# Patient Record
Sex: MALE | Race: OTHER | NOT HISPANIC OR LATINO
[De-identification: names, ages, dates, MRNs, and addresses within clinical notes are randomized per-mention and may not be internally consistent; named-entity substitution may affect disease eponyms.]

---

## 2018-05-10 ENCOUNTER — TRANSCRIPTION ENCOUNTER (OUTPATIENT)
Age: 38
End: 2018-05-10

## 2018-05-11 ENCOUNTER — OUTPATIENT (OUTPATIENT)
Dept: OUTPATIENT SERVICES | Facility: HOSPITAL | Age: 38
LOS: 1 days | Discharge: HOME | End: 2018-05-11
Payer: MEDICARE

## 2018-05-11 DIAGNOSIS — M50.10 CERVICAL DISC DISORDER WITH RADICULOPATHY, UNSPECIFIED CERVICAL REGION: ICD-10-CM

## 2018-06-01 ENCOUNTER — OUTPATIENT (OUTPATIENT)
Dept: OUTPATIENT SERVICES | Facility: HOSPITAL | Age: 38
LOS: 1 days | End: 2018-06-01
Payer: MEDICARE

## 2018-06-01 DIAGNOSIS — M50.10 CERVICAL DISC DISORDER WITH RADICULOPATHY, UNSPECIFIED CERVICAL REGION: ICD-10-CM

## 2018-06-01 PROCEDURE — 76000 FLUOROSCOPY <1 HR PHYS/QHP: CPT

## 2018-06-01 PROCEDURE — 62321 NJX INTERLAMINAR CRV/THRC: CPT

## 2018-06-07 ENCOUNTER — TRANSCRIPTION ENCOUNTER (OUTPATIENT)
Age: 38
End: 2018-06-07

## 2018-06-08 ENCOUNTER — OUTPATIENT (OUTPATIENT)
Dept: OUTPATIENT SERVICES | Facility: HOSPITAL | Age: 38
LOS: 1 days | End: 2018-06-08
Payer: MEDICARE

## 2018-06-08 DIAGNOSIS — M47.812 SPONDYLOSIS WITHOUT MYELOPATHY OR RADICULOPATHY, CERVICAL REGION: ICD-10-CM

## 2018-06-08 PROCEDURE — 76000 FLUOROSCOPY <1 HR PHYS/QHP: CPT

## 2018-06-08 PROCEDURE — 77003 FLUOROGUIDE FOR SPINE INJECT: CPT

## 2018-06-08 PROCEDURE — 64490 INJ PARAVERT F JNT C/T 1 LEV: CPT | Mod: LT

## 2018-06-08 PROCEDURE — 62321 NJX INTERLAMINAR CRV/THRC: CPT

## 2018-06-08 PROCEDURE — 64491 INJ PARAVERT F JNT C/T 2 LEV: CPT | Mod: LT

## 2018-06-08 PROCEDURE — 64492 INJ PARAVERT F JNT C/T 3 LEV: CPT | Mod: LT

## 2019-03-21 ENCOUNTER — OUTPATIENT (OUTPATIENT)
Dept: OUTPATIENT SERVICES | Facility: HOSPITAL | Age: 39
LOS: 1 days | End: 2019-03-21
Payer: MEDICARE

## 2019-03-21 DIAGNOSIS — M50.10 CERVICAL DISC DISORDER WITH RADICULOPATHY, UNSPECIFIED CERVICAL REGION: ICD-10-CM

## 2019-03-21 PROCEDURE — 62321 NJX INTERLAMINAR CRV/THRC: CPT

## 2019-03-21 PROCEDURE — 77003 FLUOROGUIDE FOR SPINE INJECT: CPT

## 2019-04-29 ENCOUNTER — TRANSCRIPTION ENCOUNTER (OUTPATIENT)
Age: 39
End: 2019-04-29

## 2019-04-30 ENCOUNTER — OUTPATIENT (OUTPATIENT)
Dept: OUTPATIENT SERVICES | Facility: HOSPITAL | Age: 39
LOS: 1 days | End: 2019-04-30
Payer: MEDICARE

## 2019-04-30 DIAGNOSIS — M47.812 SPONDYLOSIS WITHOUT MYELOPATHY OR RADICULOPATHY, CERVICAL REGION: ICD-10-CM

## 2019-05-07 ENCOUNTER — OUTPATIENT (OUTPATIENT)
Dept: OUTPATIENT SERVICES | Facility: HOSPITAL | Age: 39
LOS: 1 days | End: 2019-05-07
Payer: MEDICARE

## 2019-05-07 DIAGNOSIS — M47.812 SPONDYLOSIS WITHOUT MYELOPATHY OR RADICULOPATHY, CERVICAL REGION: ICD-10-CM

## 2019-05-07 PROCEDURE — 76000 FLUOROSCOPY <1 HR PHYS/QHP: CPT

## 2019-05-07 PROCEDURE — 64492 INJ PARAVERT F JNT C/T 3 LEV: CPT | Mod: RT

## 2019-05-07 PROCEDURE — 64491 INJ PARAVERT F JNT C/T 2 LEV: CPT | Mod: RT

## 2019-05-07 PROCEDURE — 64490 INJ PARAVERT F JNT C/T 1 LEV: CPT | Mod: RT

## 2020-07-20 PROBLEM — Z00.00 ENCOUNTER FOR PREVENTIVE HEALTH EXAMINATION: Status: ACTIVE | Noted: 2020-07-20

## 2020-07-27 ENCOUNTER — TRANSCRIPTION ENCOUNTER (OUTPATIENT)
Age: 40
End: 2020-07-27

## 2020-07-27 ENCOUNTER — APPOINTMENT (OUTPATIENT)
Dept: ORTHOPEDIC SURGERY | Facility: CLINIC | Age: 40
End: 2020-07-27
Payer: MEDICARE

## 2020-07-27 VITALS — TEMPERATURE: 97.7 F

## 2020-07-27 VITALS
DIASTOLIC BLOOD PRESSURE: 77 MMHG | HEART RATE: 67 BPM | WEIGHT: 205 LBS | SYSTOLIC BLOOD PRESSURE: 122 MMHG | HEIGHT: 70 IN | BODY MASS INDEX: 29.35 KG/M2

## 2020-07-27 DIAGNOSIS — Z87.09 PERSONAL HISTORY OF OTHER DISEASES OF THE RESPIRATORY SYSTEM: ICD-10-CM

## 2020-07-27 DIAGNOSIS — Z82.0 FAMILY HISTORY OF EPILEPSY AND OTHER DISEASES OF THE NERVOUS SYSTEM: ICD-10-CM

## 2020-07-27 DIAGNOSIS — Z86.39 PERSONAL HISTORY OF OTHER ENDOCRINE, NUTRITIONAL AND METABOLIC DISEASE: ICD-10-CM

## 2020-07-27 DIAGNOSIS — Z83.3 FAMILY HISTORY OF DIABETES MELLITUS: ICD-10-CM

## 2020-07-27 DIAGNOSIS — Z78.9 OTHER SPECIFIED HEALTH STATUS: ICD-10-CM

## 2020-07-27 DIAGNOSIS — M25.561 PAIN IN RIGHT KNEE: ICD-10-CM

## 2020-07-27 PROCEDURE — 73562 X-RAY EXAM OF KNEE 3: CPT | Mod: RT

## 2020-07-27 PROCEDURE — 99203 OFFICE O/P NEW LOW 30 MIN: CPT

## 2020-07-27 RX ORDER — ATORVASTATIN CALCIUM 80 MG/1
TABLET, FILM COATED ORAL
Refills: 0 | Status: ACTIVE | COMMUNITY

## 2020-07-27 RX ORDER — CARISOPRODOL 250 MG/1
TABLET ORAL
Refills: 0 | Status: ACTIVE | COMMUNITY

## 2020-07-27 RX ORDER — FAMOTIDINE 20 MG/1
20 TABLET, FILM COATED ORAL
Refills: 0 | Status: ACTIVE | COMMUNITY

## 2020-07-27 RX ORDER — MONTELUKAST SODIUM 10 MG/1
TABLET, FILM COATED ORAL
Refills: 0 | Status: ACTIVE | COMMUNITY

## 2020-07-27 RX ORDER — HYDROCODONE BITARTRATE AND ACETAMINOPHEN 7.5; 325 MG/1; MG/1
7.5-325 TABLET ORAL
Refills: 0 | Status: ACTIVE | COMMUNITY

## 2020-07-27 RX ORDER — ONDANSETRON HYDROCHLORIDE 4 MG/1
4 TABLET, FILM COATED ORAL
Refills: 0 | Status: ACTIVE | COMMUNITY

## 2020-07-27 NOTE — HISTORY OF PRESENT ILLNESS
[Pain Location] : pain [___ wks] : [unfilled] week(s) ago [5] : a current pain level of 5/10 [Walking] : worsened by walking [Bending] : worsened by bending [Intermit.] : ~He/She~ states the symptoms seem to be intermittent [Knee Flexion] : worsened with knee flexion [Rest] : relieved by rest [Ice] : relieved by ice [Improving] : improving [de-identified] : This is 38 y/o male with right knee pain for 3 weeks. He woke up one morning with severe pain and swelling after few days of mild pain. He was seen by [facility] orthopedics. He had MRI of right knee showing meniscus tear. He is here for surgical evaluation. His pain is in the medial > lateral joint, it is localized. The pain is sharp and stabbing. Applying ice improve the pain. Walking, bending, and squatting make the pain worse. He has been using Tylenol. He report c spine sx last in 1/2020 with Dr. Martinez. He denies having gout.

## 2020-07-27 NOTE — END OF VISIT
[FreeTextEntry3] : I, Anibal Clement, acted solely as a scribe for Dr. Jean Marie Fox on this date 07/27/2020.

## 2020-07-27 NOTE — DISCUSSION/SUMMARY
[de-identified] : 38 y/o male with free edge tear of the posterior horn medial meniscus tear of the right knee. Conservative therapy and surgical options discussed in detail with patient. We discussed the possible reasons why he may have had effusion including gout, pseudogout, Lymes disease, and rheumatoid arthritis, but at this point his symptoms are resolving. We prescribed him PT. He should do low impact exercises. If the pain is persistent, we discussed aspirating the knee and an arthroscopy and possible Rheum eval. F/u with us in eight weeks.\par \par \par The percentages of success in an arthroscopy that involves a torn meniscus and arthritic changes is dependent upon how bad the arthritic changes are. Basically, removing a meniscal tear allows us to ascertain how bad the patient's articular cartilage destruction (arthritis) is. The arthroscopy cleans out any debris from the arthritic process as well as removing the meniscal tear. Approximately 75% of the patients will say that they feel relief, although their x-rays will continue to show significant arthritic changes. Arthroscopy for arthritis is a temporizing procedure, yielding subjective success (patient satisfaction) for less than two to five years. In some cases, the knee might eventually require a knee replacement for symptomatic relief. The prognostic factors that are somewhat favorable predictive values in arthroscopic debridements (removal of loose articular cartilage, loose body and inflamed synovium) of an arthritic knee are: short duration of symptoms, effusion (swelling), minimal deformity and good range of motion. The complications with any arthroscopy include the risk of anaesthetic complications and death, blood clots and pulmonary embolus, infection (less than 1%), nerve damage, by which we would mean a peroneal palsy (less than 0.1%) (small area of skin numbness is so common, we do not consider its presence a complication), injury to the popliteal artery, which is so rare that there are no statistics, but should it occur could theoretically lead to amputation, which is extremely unlikely. There is often a chance of getting a hemarthrosis (blood in the joint) but this usually resolves with local measures of icing, physical therapy, and aspiration. Reflex sympathetic dystrophy (RSD) is another extremely rare but theoretical complication. This (RSD) means that the patient has a stiff painful joint that is out of proportion to the objective pathology of the knee. Subsequently, it might require years of physical therapy before one regains a functional knee with RSD. Infrapatellar contracture syndrome (stiff joint) is sometimes reported and associated with RSD, but it usually is a result of not being aggressive in physical therapy. I think the patient understands the risk benefit ratio of arthroscopy and will think about whether they would prefer the nonoperative or surgical treatment option.

## 2020-07-27 NOTE — PHYSICAL EXAM
[de-identified] : GENERAL APPEARANCE:   Well nourished and hydrated, pleasant, alert, and oriented x 4.  \par CARDIOVASCULAR:   No apparent abnormalities.  No lower leg edema.  No varicosities.  Pedal pulses are palpable.\par RESPIRATORY: Breathe sound clear and audible in all lobes. No wheezing, No accessory muscle use.\par NEUROLOGIC:   Sensation is normal, no muscle weakness in the upper or lower extremities.\par DERMATOLOGIC:   No apparent skin lesions, moist, warm, no rash.\par SPINE:   Cervical spine appears normal and moves freely; thoracic spine appears normal and moves freely; lumbosacral spine appears normal and moves freely, normal, nontender.\par MUSCULOSKELETAL:   Hands, wrists, and elbows are normal and move freely, shoulders are normal and move freely. \par  [Antalgic] : not antalgic [de-identified] : right knee examination shows neutral alignment, medial and lateral joint line tenderness where medial is worse than lateral, positive medial Mariah's maneuver [de-identified] : 3 view right knee x-rays demonstrate a well-preserved joints without findings of any evidence of fracture, dislocation,\par  or any other acute orthopedic pathology. There is No radiographic features of OA are present.\par \par MRI of right knee done in 7/14/2020 showing free edge tear of the posterior horn medial meniscus\par mild medial compartment chondral generation.\par reactive subchondral bone marrow edema in the posteromedial tibial plateau\par large effusion and small popiteal cyst

## 2020-10-23 ENCOUNTER — APPOINTMENT (OUTPATIENT)
Dept: ORTHOPEDIC SURGERY | Facility: CLINIC | Age: 40
End: 2020-10-23

## 2021-01-17 ENCOUNTER — OUTPATIENT (OUTPATIENT)
Dept: OUTPATIENT SERVICES | Facility: HOSPITAL | Age: 41
LOS: 1 days | End: 2021-01-17
Payer: MEDICARE

## 2021-01-17 DIAGNOSIS — Z20.828 CONTACT WITH AND (SUSPECTED) EXPOSURE TO OTHER VIRAL COMMUNICABLE DISEASES: ICD-10-CM

## 2021-01-17 LAB — SARS-COV-2 RNA SPEC QL NAA+PROBE: SIGNIFICANT CHANGE UP

## 2021-01-17 PROCEDURE — U0005: CPT

## 2021-01-17 PROCEDURE — C9803: CPT

## 2021-01-17 PROCEDURE — U0003: CPT

## 2021-01-18 DIAGNOSIS — Z20.828 CONTACT WITH AND (SUSPECTED) EXPOSURE TO OTHER VIRAL COMMUNICABLE DISEASES: ICD-10-CM

## 2021-09-01 ENCOUNTER — TRANSCRIPTION ENCOUNTER (OUTPATIENT)
Age: 41
End: 2021-09-01

## 2021-09-24 ENCOUNTER — APPOINTMENT (OUTPATIENT)
Dept: ORTHOPEDIC SURGERY | Facility: CLINIC | Age: 41
End: 2021-09-24
Payer: MEDICARE

## 2021-09-24 VITALS
SYSTOLIC BLOOD PRESSURE: 117 MMHG | HEIGHT: 70 IN | WEIGHT: 205 LBS | DIASTOLIC BLOOD PRESSURE: 81 MMHG | HEART RATE: 70 BPM | BODY MASS INDEX: 29.35 KG/M2

## 2021-09-24 PROCEDURE — 99213 OFFICE O/P EST LOW 20 MIN: CPT

## 2021-09-24 RX ORDER — MELOXICAM 15 MG/1
15 TABLET ORAL
Qty: 30 | Refills: 0 | Status: ACTIVE | COMMUNITY
Start: 2021-05-19

## 2021-09-24 RX ORDER — METHOCARBAMOL 500 MG/1
500 TABLET, FILM COATED ORAL
Qty: 50 | Refills: 0 | Status: ACTIVE | COMMUNITY
Start: 2021-07-06

## 2021-09-24 RX ORDER — ONDANSETRON 4 MG/1
4 TABLET, ORALLY DISINTEGRATING ORAL
Qty: 20 | Refills: 0 | Status: ACTIVE | COMMUNITY
Start: 2021-02-23

## 2021-09-24 RX ORDER — ATORVASTATIN CALCIUM 40 MG/1
40 TABLET, FILM COATED ORAL
Qty: 30 | Refills: 0 | Status: ACTIVE | COMMUNITY
Start: 2021-05-11

## 2021-09-24 NOTE — DISCUSSION/SUMMARY
[de-identified] : 40 y/o male with right knee pain. He had a right knee MRI on 7/14/2020 which showed a free edge tear of the posterior horn medial meniscus tear of the right knee. He started to develop right knee pain again recently without injury/trauma. His pain is consistent with the pain he had one year ago. He will get an updated MRI of the right knee to r/o recurrent meniscus tear. We offered to provide a pes bursa cortisone injection today, but he deferred. F/u after the MRI.

## 2021-09-24 NOTE — END OF VISIT
[FreeTextEntry3] : I, Anibal Clement, acted solely as a scribe for Dr. Jean Marie Fox on this date 09/24/2021.

## 2021-09-24 NOTE — HISTORY OF PRESENT ILLNESS
[Pain Location] : pain [___ yrs] : [unfilled] year(s) ago [5] : a current pain level of 5/10 [Intermit.] : ~He/She~ states the symptoms seem to be intermittent [Bending] : worsened by bending [Walking] : worsened by walking [Knee Flexion] : worsened with knee flexion [Ice] : relieved by ice [Rest] : relieved by rest [de-identified] : 42 y/o male retired  from a back fusion. He comes back to the office today for right knee pain without injury/trauma. His pain is in the medial and anterior aspect of the knee. We originally saw the pt in July 2020 when his pain first started. He had a right knee MRI on 7/14/2020 which showed a Nonspecific free edge tear of the posterior horn medial meniscus. He has pain with walking, bending, and squatting. Pt has not gone to PT nor a rheumatology evaluation.

## 2021-09-24 NOTE — PHYSICAL EXAM
[LE] : Sensory: Intact in bilateral lower extremities [ALL] : dorsalis pedis, posterior tibial, femoral, popliteal, and radial 2+ and symmetric bilaterally [Normal] : Alert and in no acute distress [Antalgic] : not antalgic [Poor Appearance] : well-appearing [de-identified] : GENERAL APPEARANCE: Well nourished and hydrated, pleasant, alert, and oriented x 3. Appears their stated age. \par HEENT: Normocephalic, extraocular eye motion intact. Nasal septum midline. Oral cavity clear. External auditory canal clear. \par RESPIRATORY: Breath sounds clear and audible in all lobes. No wheezing, No accessory muscle use.\par CARDIOVASCULAR: No apparent abnormalities. No lower leg edema. No varicosities. Pedal pulses are palpable.\par NEUROLOGIC: Sensation is normal, no muscle weakness in the upper or lower extremities.\par DERMATOLOGIC: No apparent skin lesions, moist, warm, no rash.\par SPINE: Cervical spine appears normal and moves freely; thoracic spine appears normal and moves freely; lumbosacral spine appears normal and moves freely, normal, nontender.\par MUSCULOSKELETAL: Hands, wrists, and elbows are normal and move freely, shoulders are normal and move freely.  [de-identified] : right knee examination shows tenderness over the pes bursa, positive medial Mariah's maneuver, mild effusion.  [de-identified] : MRI of the right knee performed at [facility] on 7/14/2020 showed the following:\par 1. Nonspecific free edge tear of the posterior horn medial meniscus.\par 2. Mild medial compartment and minor lateral tibial plateau chondral degeneration.\par 3. Minimal reactive subchondral bone marrow edema in the posteromedial tibial plateau.\par 4. Large joint effusion with small popliteal cyst and mild scattered synovial hypertrophy.

## 2021-10-01 ENCOUNTER — APPOINTMENT (OUTPATIENT)
Dept: ORTHOPEDIC SURGERY | Facility: CLINIC | Age: 41
End: 2021-10-01
Payer: MEDICARE

## 2021-10-01 DIAGNOSIS — M25.461 EFFUSION, RIGHT KNEE: ICD-10-CM

## 2021-10-01 PROCEDURE — 99441: CPT | Mod: 95

## 2021-10-01 NOTE — HISTORY OF PRESENT ILLNESS
[Pain Location] : pain [4] : a current pain level of 4/10 [de-identified] : 42 y/o male presented via [employer] to review MRI of right knee done on 9/29/21\par the pain started without injury \par  His pain is in the medial and anterior aspect of the knee.\par \par  He had a right knee MRI on 7/14/2020 which showed a Nonspecific free edge tear of the posterior horn medial meniscus. He has pain with walking, bending, and squatting. \par \par His most recent MRI MRI shows no specific internal derangement.,  He does have a small popliteal cyst.\par \par Symptoms of the same he still has pain on the medial aspect of the knee

## 2021-10-01 NOTE — DISCUSSION/SUMMARY
[de-identified] : 42 y/o male presented via [employer] to review MRI of right knee done on 9/29/21\par the pain started without injury \par  His pain is in the medial and anterior aspect of the knee.\par \par  He had a right knee MRI on 7/14/2020 which showed a Nonspecific free edge tear of the posterior horn medial meniscus. He has pain with walking, bending, and squatting. \par \par His most recent MRI MRI shows no specific internal derangement.,  He does have a small popliteal cyst.\par \par Symptoms of the same he still has pain on the medial aspect of the knee.\par \par \par I encouraged the patient to begin physical therapy.  A prescription was provided.  If he does not improve with conservative treatment he will follow back up with us

## 2022-06-27 ENCOUNTER — NON-APPOINTMENT (OUTPATIENT)
Age: 42
End: 2022-06-27

## 2022-06-29 ENCOUNTER — APPOINTMENT (OUTPATIENT)
Dept: ORTHOPEDIC SURGERY | Facility: CLINIC | Age: 42
End: 2022-06-29

## 2022-06-29 VITALS
DIASTOLIC BLOOD PRESSURE: 86 MMHG | BODY MASS INDEX: 29.35 KG/M2 | HEIGHT: 70 IN | SYSTOLIC BLOOD PRESSURE: 137 MMHG | HEART RATE: 65 BPM | WEIGHT: 205 LBS

## 2022-06-29 DIAGNOSIS — S83.242D OTHER TEAR OF MEDIAL MENISCUS, CURRENT INJURY, LEFT KNEE, SUBSEQUENT ENCOUNTER: ICD-10-CM

## 2022-06-29 PROCEDURE — 99214 OFFICE O/P EST MOD 30 MIN: CPT | Mod: 25

## 2022-06-29 PROCEDURE — 20610 DRAIN/INJ JOINT/BURSA W/O US: CPT | Mod: LT

## 2022-06-29 PROCEDURE — 73562 X-RAY EXAM OF KNEE 3: CPT | Mod: LT

## 2022-06-29 NOTE — PHYSICAL EXAM
[LE] : Sensory: Intact in bilateral lower extremities [ALL] : dorsalis pedis, posterior tibial, femoral, popliteal, and radial 2+ and symmetric bilaterally [Normal] : Alert and in no acute distress [Antalgic] : not antalgic [Poor Appearance] : well-appearing [de-identified] : GENERAL APPEARANCE: Well nourished and hydrated, pleasant, alert, and oriented x 3. Appears their stated age. \par HEENT: Normocephalic, extraocular eye motion intact. Nasal septum midline. Oral cavity clear. External auditory canal clear. \par RESPIRATORY: Breath sounds clear and audible in all lobes. No wheezing, No accessory muscle use.\par CARDIOVASCULAR: No apparent abnormalities. No lower leg edema. No varicosities. Pedal pulses are palpable.\par NEUROLOGIC: Sensation is normal, no muscle weakness in the upper or lower extremities.\par DERMATOLOGIC: No apparent skin lesions, moist, warm, no rash.\par SPINE: Cervical spine appears normal and moves freely; thoracic spine appears normal and moves freely; lumbosacral spine appears normal and moves freely, normal, nontender.\par MUSCULOSKELETAL: Hands, wrists, and elbows are normal and move freely, shoulders are normal and move freely.  [de-identified] : Left knee exam shows FROM, no effusion, medial joint line tenderness [de-identified] : 3V xray of the left knee done in the office today and reviewed by Dr. Jean Marie Fox demonstrates minimal medial compartmental osteoarthritis \par \par MRI of the left knee performed at [facility] on 6/8/2022 showed the following:\par 1. Medial meniscus tear. The lateral meniscus and the cruciate ligaments are intact.\par 2. Cartilage loss along the weightbearing surface of the medial femoral condyle.\par 3. Moderate, thin fluid within the semimembranosus bursa.

## 2022-06-29 NOTE — DISCUSSION/SUMMARY
[Medication Risks Reviewed] : Medication risks reviewed [Surgical risks reviewed] : Surgical risks reviewed [de-identified] : 40 y/o M with minimal medial compartmental osteoarthritis and a radial tear of the medial meniscus of the left knee. Surgical options and conservative therapies were discussed in detail. Based on imaging, he is a candidate for a left knee arthroscopy. The conservative therapies we talked about included cortisone injections, antiinflammatories, and PT. We encourage him to start with conservative therapies and if he doesn't respond, it would be reasonable for him to pursue a knee scope. He opted for a left knee cortisone injection and we gave her a rx for PT. F/u in 3 months.\par \par The percentages of success in an arthroscopy that involves a torn meniscus and arthritic changes is dependent upon how bad the arthritic changes are. Basically, removing a meniscal tear allows us to ascertain how bad the patient's articular cartilage destruction (arthritis) is. The arthroscopy cleans out any debris from the arthritic process as well as removing the meniscal tear. Approximately 75% of the patients will say that they feel relief, although their x-rays will continue to show significant arthritic changes. Arthroscopy for arthritis is a temporizing procedure, yielding subjective success (patient satisfaction) for less than two to five years. In some cases, the knee might eventually require a knee replacement for symptomatic relief. The prognostic factors that are somewhat favorable predictive values in arthroscopic debridements (removal of loose articular cartilage, loose body and inflamed synovium) of an arthritic knee are: short duration of symptoms, effusion (swelling), minimal deformity and good range of motion. The complications with any arthroscopy include the risk of anaesthetic complications and death, blood clots and pulmonary embolus, infection (less than 1%), nerve damage, by which we would mean a peroneal palsy (less than 0.1%) (small area of skin numbness is so common, we do not consider its presence a complication), injury to the popliteal artery, which is so rare that there are no statistics, but should it occur could theoretically lead to amputation, which is extremely unlikely. There is often a chance of getting a hemarthrosis (blood in the joint) but this usually resolves with local measures of icing, physical therapy, and aspiration. Reflex sympathetic dystrophy (RSD) is another extremely rare but theoretical complication. This (RSD) means that the patient has a stiff painful joint that is out of proportion to the objective pathology of the knee. Subsequently, it might require years of physical therapy before one regains a functional knee with RSD. Infrapatellar contracture syndrome (stiff joint) is sometimes reported and associated with RSD, but it usually is a result of not being aggressive in physical therapy. I think the patient understands the risk benefit ratio of arthroscopy and will think about whether they would prefer the nonoperative or surgical treatment option.

## 2022-06-29 NOTE — PROCEDURE
[de-identified] : I injected the patient's left knee today with cortisone for primary osteoarthritis.\par \par I discussed at length with the patient the planned steroid and lidocaine injection. The risks, benefits, convalescence and alternatives were reviewed. The possible side effects discussed included but were not limited to: pain, swelling, heat, bleeding, and redness. Symptoms are generally mild but if they are extensive then contact the office. Giving pain relievers by mouth such as NSAIDs or Tylenol can generally treat the reactions to steroid and lidocaine. Rare cases of infection have been noted. Rash, hives and itching may occur post injection. If you have muscle pain or cramps, flushing and or swelling of the face, rapid heart beat, nausea, dizziness, fever, chills, headache, difficulty breathing, swelling in the arms or legs, or have a prickly feeling of your skin, contact a health care provider immediately. Following this discussion, the knee was prepped with Alcohol and under sterile condition the 80 mg Depo-Medrol and 6 cc Lidocaine injection was performed with a 20 gauge needle through a superolateral injection site. The needle was introduced into the joint, aspiration was performed to ensure intra-articular placement and the medication was injected. Upon withdrawal of the needle the site was cleaned with alcohol and a band aid applied. The patient tolerated the injection well and there were no adverse effects. Post injection instructions included no strenuous activity for 24 hours, cryotherapy and if there are any adverse effects to contact the office.

## 2022-06-29 NOTE — END OF VISIT
[FreeTextEntry3] : I, Anibal Clement, acted solely as a scribe for Dr. Jean Marie Fox on this date 06/29/2022.

## 2022-06-29 NOTE — HISTORY OF PRESENT ILLNESS
[2] : a current pain level of 2/10 [7] : a maximum pain level of 7/10 [Pain Location] : pain [NSAIDs] : relieved by nonsteroidal anti-inflammatory drugs [Rest] : relieved by rest [de-identified] : 42 y/o M presents with left knee pain. The pain started about 1 M ago without injury. He notes sharp medial knee pain with twisting. He also has pain with stairs. He notes some instability. He had a left knee MRI on 6/8/22 showing a small radial tear of medial meniscus and cartilage loss at the wt bearing surface of the medical femoral condyle. He sometimes takes Mobic which helps. Pt was seen for right knee pain in the past and he reports mild intermittent pan. He is a retired  from a back fusion. [de-identified] : stairs, twisting

## 2022-09-26 ENCOUNTER — APPOINTMENT (OUTPATIENT)
Dept: ORTHOPEDIC SURGERY | Facility: CLINIC | Age: 42
End: 2022-09-26

## 2022-09-26 VITALS
HEIGHT: 70 IN | HEART RATE: 66 BPM | SYSTOLIC BLOOD PRESSURE: 124 MMHG | TEMPERATURE: 97.9 F | DIASTOLIC BLOOD PRESSURE: 84 MMHG

## 2022-09-26 DIAGNOSIS — S83.241A OTHER TEAR OF MEDIAL MENISCUS, CURRENT INJURY, RIGHT KNEE, INITIAL ENCOUNTER: ICD-10-CM

## 2022-09-26 DIAGNOSIS — M17.12 UNILATERAL PRIMARY OSTEOARTHRITIS, LEFT KNEE: ICD-10-CM

## 2022-09-26 DIAGNOSIS — M17.11 UNILATERAL PRIMARY OSTEOARTHRITIS, RIGHT KNEE: ICD-10-CM

## 2022-09-26 PROCEDURE — 99214 OFFICE O/P EST MOD 30 MIN: CPT

## 2022-10-11 ENCOUNTER — NON-APPOINTMENT (OUTPATIENT)
Age: 42
End: 2022-10-11

## 2022-10-24 ENCOUNTER — OUTPATIENT (OUTPATIENT)
Dept: OUTPATIENT SERVICES | Facility: HOSPITAL | Age: 42
LOS: 1 days | End: 2022-10-24
Payer: MEDICARE

## 2022-10-24 VITALS
DIASTOLIC BLOOD PRESSURE: 68 MMHG | WEIGHT: 203.71 LBS | HEART RATE: 72 BPM | SYSTOLIC BLOOD PRESSURE: 106 MMHG | HEIGHT: 70 IN | TEMPERATURE: 97 F | OXYGEN SATURATION: 98 % | RESPIRATION RATE: 16 BRPM

## 2022-10-24 DIAGNOSIS — Z29.9 ENCOUNTER FOR PROPHYLACTIC MEASURES, UNSPECIFIED: ICD-10-CM

## 2022-10-24 DIAGNOSIS — Z01.818 ENCOUNTER FOR OTHER PREPROCEDURAL EXAMINATION: ICD-10-CM

## 2022-10-24 DIAGNOSIS — Z98.890 OTHER SPECIFIED POSTPROCEDURAL STATES: Chronic | ICD-10-CM

## 2022-10-24 DIAGNOSIS — M25.562 PAIN IN LEFT KNEE: ICD-10-CM

## 2022-10-24 LAB
A1C WITH ESTIMATED AVERAGE GLUCOSE RESULT: 5.5 % — SIGNIFICANT CHANGE UP (ref 4–5.6)
ANION GAP SERPL CALC-SCNC: 6 MMOL/L — SIGNIFICANT CHANGE UP (ref 5–17)
APTT BLD: 29.8 SEC — SIGNIFICANT CHANGE UP (ref 27.5–35.5)
BASOPHILS # BLD AUTO: 0.05 K/UL — SIGNIFICANT CHANGE UP (ref 0–0.2)
BASOPHILS NFR BLD AUTO: 1 % — SIGNIFICANT CHANGE UP (ref 0–2)
BLD GP AB SCN SERPL QL: SIGNIFICANT CHANGE UP
BUN SERPL-MCNC: 13 MG/DL — SIGNIFICANT CHANGE UP (ref 8–20)
CALCIUM SERPL-MCNC: 8.9 MG/DL — SIGNIFICANT CHANGE UP (ref 8.4–10.5)
CHLORIDE SERPL-SCNC: 103 MMOL/L — SIGNIFICANT CHANGE UP (ref 98–107)
CO2 SERPL-SCNC: 31 MMOL/L — HIGH (ref 22–29)
CREAT SERPL-MCNC: 0.98 MG/DL — SIGNIFICANT CHANGE UP (ref 0.5–1.3)
EGFR: 99 ML/MIN/1.73M2 — SIGNIFICANT CHANGE UP
EOSINOPHIL # BLD AUTO: 0.18 K/UL — SIGNIFICANT CHANGE UP (ref 0–0.5)
EOSINOPHIL NFR BLD AUTO: 3.7 % — SIGNIFICANT CHANGE UP (ref 0–6)
ESTIMATED AVERAGE GLUCOSE: 111 MG/DL — SIGNIFICANT CHANGE UP (ref 68–114)
GLUCOSE SERPL-MCNC: 104 MG/DL — HIGH (ref 70–99)
HCT VFR BLD CALC: 46.8 % — SIGNIFICANT CHANGE UP (ref 39–50)
HGB BLD-MCNC: 15.3 G/DL — SIGNIFICANT CHANGE UP (ref 13–17)
IMM GRANULOCYTES NFR BLD AUTO: 0.2 % — SIGNIFICANT CHANGE UP (ref 0–0.9)
INR BLD: 0.98 RATIO — SIGNIFICANT CHANGE UP (ref 0.88–1.16)
LYMPHOCYTES # BLD AUTO: 1.43 K/UL — SIGNIFICANT CHANGE UP (ref 1–3.3)
LYMPHOCYTES # BLD AUTO: 29.2 % — SIGNIFICANT CHANGE UP (ref 13–44)
MCHC RBC-ENTMCNC: 28.4 PG — SIGNIFICANT CHANGE UP (ref 27–34)
MCHC RBC-ENTMCNC: 32.7 GM/DL — SIGNIFICANT CHANGE UP (ref 32–36)
MCV RBC AUTO: 87 FL — SIGNIFICANT CHANGE UP (ref 80–100)
MONOCYTES # BLD AUTO: 0.38 K/UL — SIGNIFICANT CHANGE UP (ref 0–0.9)
MONOCYTES NFR BLD AUTO: 7.8 % — SIGNIFICANT CHANGE UP (ref 2–14)
NEUTROPHILS # BLD AUTO: 2.84 K/UL — SIGNIFICANT CHANGE UP (ref 1.8–7.4)
NEUTROPHILS NFR BLD AUTO: 58.1 % — SIGNIFICANT CHANGE UP (ref 43–77)
PLATELET # BLD AUTO: 254 K/UL — SIGNIFICANT CHANGE UP (ref 150–400)
POTASSIUM SERPL-MCNC: 4.4 MMOL/L — SIGNIFICANT CHANGE UP (ref 3.5–5.3)
POTASSIUM SERPL-SCNC: 4.4 MMOL/L — SIGNIFICANT CHANGE UP (ref 3.5–5.3)
PROTHROM AB SERPL-ACNC: 11.4 SEC — SIGNIFICANT CHANGE UP (ref 10.5–13.4)
RBC # BLD: 5.38 M/UL — SIGNIFICANT CHANGE UP (ref 4.2–5.8)
RBC # FLD: 13 % — SIGNIFICANT CHANGE UP (ref 10.3–14.5)
SODIUM SERPL-SCNC: 140 MMOL/L — SIGNIFICANT CHANGE UP (ref 135–145)
WBC # BLD: 4.89 K/UL — SIGNIFICANT CHANGE UP (ref 3.8–10.5)
WBC # FLD AUTO: 4.89 K/UL — SIGNIFICANT CHANGE UP (ref 3.8–10.5)

## 2022-10-24 PROCEDURE — 93010 ELECTROCARDIOGRAM REPORT: CPT

## 2022-10-24 PROCEDURE — 93005 ELECTROCARDIOGRAM TRACING: CPT

## 2022-10-24 PROCEDURE — G0463: CPT

## 2022-10-24 RX ORDER — SODIUM CHLORIDE 9 MG/ML
3 INJECTION INTRAMUSCULAR; INTRAVENOUS; SUBCUTANEOUS ONCE
Refills: 0 | Status: DISCONTINUED | OUTPATIENT
Start: 2022-11-08 | End: 2022-11-22

## 2022-10-24 NOTE — H&P PST ADULT - NSICDXPASTMEDICALHX_GEN_ALL_CORE_FT
PAST MEDICAL HISTORY:  Cervical radiculopathy     Hyperlipidemia     Knee meniscus pain, left     OA (osteoarthritis)

## 2022-10-24 NOTE — H&P PST ADULT - HEIGHT IN FEET
[FreeTextEntry1] : Migraines without aura, controlled on Fiorecet 3 x/day for while and Amitriptyline 10mg HS for a while.  Patient will bring copy of her MRI brain and EKG.  Will consider titrating Amitriptyline and tapering Fiorecet, if QTC normal, as Fiorecet can cause rebound headaches.
5

## 2022-10-24 NOTE — H&P PST ADULT - HISTORY OF PRESENT ILLNESS
43 y/o male with a pmhx of HLD, cervical spine surgery, mild OA  presents to [facility] with complaint of  left knee pain for    He notes sharp medial knee pain with twisting. He also has pain with stairs. He notes some instability.   He had a left knee MRI on 22 showing a small radial tear of medial meniscus and cartilage loss at the wt bearing surface of the medical femoral condyle.   S/p 9 sessions of PT without improvement in symptoms, has tried cortisone injections with temporary relief    He sometimes takes Mobic which helps.      MRI of the left knee performed at [facility] on 2022 showed the followin. Medial meniscus tear. The lateral meniscus and the cruciate ligaments are intact.  2. Cartilage loss along the weightbearing surface of the medial femoral condyle.  3. Moderate, thin fluid within the semimembranosus bursa.     Patient is now scheduled for on 22 with Dr Fox    43 y/o male with a pmhx of HLD, cervical spine surgery, lumbar spine surgery, mild OA  presents to [facility] with complaint of  left knee pain for years but has gotten progressively worse in the last few months, intermittent    He notes sharp medial knee pain with twisting. He also has pain with stairs. He notes some instability toward the end of the day,  5/10 in severity   He had a left knee MRI on 22 showing a small radial tear of medial meniscus and cartilage loss at the wt bearing surface of the medical femoral condyle.   S/p 9 sessions of PT without improvement in symptoms, has tried cortisone injections with temporary relief, taking meloxicam with some relief       MRI of the left knee performed at [facility] on 2022 showed the followin. Medial meniscus tear. The lateral meniscus and the cruciate ligaments are intact.  2. Cartilage loss along the weightbearing surface of the medial femoral condyle.  3. Moderate, thin fluid within the semimembranosus bursa.     Patient is now scheduled for left knee arthroscopy with medial meniscectomy on 22 with Dr Fox    43 y/o male retired  in 9/11, with a pmhx of HLD, cervical spine surgery, lumbar spine surgery, mild OA.  Patient presents to [facility] with complaint of  left knee pain for years but has gotten progressively worse in the last few months, pain is intermittent and sharp, located in the medial knee, worse with twisting and stairs. He notes some instability toward the end of the day,  5/10 in severity.  Patient had a left knee MRI on 6/8/22 showing a small radial tear of medial meniscus and cartilage loss at the wt bearing surface of the medical femoral condyle. He is S/p 9 sessions of PT without improvement in symptoms, has tried cortisone injections with temporary relief, taking meloxicam with some relief. Patient is now scheduled for left knee arthroscopy with medial meniscectomy on 11/8/22 with Dr Fox.

## 2022-10-24 NOTE — H&P PST ADULT - NSICDXPASTSURGICALHX_GEN_ALL_CORE_FT
PAST SURGICAL HISTORY:  H/O cervical spine surgery      PAST SURGICAL HISTORY:  H/O cervical spine surgery     History of lumbar surgery

## 2022-10-24 NOTE — H&P PST ADULT - MUSCULOSKELETAL
details… no joint swelling/no calf tenderness/decreased ROM due to pain/strength 5/5 bilateral upper extremities/decreased strength

## 2022-10-24 NOTE — H&P PST ADULT - ASSESSMENT
CAPRINI SCORE    AGE RELATED RISK FACTORS                                                             [ ] Age 41-60 years                                            (1 Point)  [ ] Age: 61-74 years                                           (2 Points)                 [ ] Age= 75 years                                                (3 Points)             DISEASE RELATED RISK FACTORS                                                       [ ] Edema in the lower extremities                 (1 Point)                     [ ] Varicose veins                                               (1 Point)                                 [ ] BMI > 25 Kg/m2                                            (1 Point)                                  [ ] Serious infection (ie PNA)                            (1 Point)                     [ ] Lung disease ( COPD, Emphysema)            (1 Point)                                                                          [ ] Acute myocardial infarction                         (1 Point)                  [ ] Congestive heart failure (in the previous month)  (1 Point)         [ ] Inflammatory bowel disease                            (1 Point)                  [ ] Central venous access, PICC or Port               (2 points)       (within the last month)                                                                [ ] Stroke (in the previous month)                        (5 Points)    [ ] Previous or present malignancy                       (2 points)                                                                                                                                                         HEMATOLOGY RELATED FACTORS                                                         [ ] Prior episodes of VTE                                     (3 Points)                     [ ] Positive family history for VTE                      (3 Points)                  [ ] Prothrombin 25481 A                                     (3 Points)                     [ ] Factor V Leiden                                                (3 Points)                        [ ] Lupus anticoagulants                                      (3 Points)                                                           [ ] Anticardiolipin antibodies                              (3 Points)                                                       [ ] High homocysteine in the blood                   (3 Points)                                             [ ] Other congenital or acquired thrombophilia      (3 Points)                                                [ ] Heparin induced thrombocytopenia                  (3 Points)                                        MOBILITY RELATED FACTORS  [ ] Bed rest                                                         (1 Point)  [ ] Plaster cast                                                    (2 points)  [ ] Bed bound for more than 72 hours           (2 Points)    GENDER SPECIFIC FACTORS  [ ] Pregnancy or had a baby within the last month   (1 Point)  [ ] Post-partum < 6 weeks                                   (1 Point)  [ ] Hormonal therapy  or oral contraception   (1 Point)  [ ] History of pregnancy complications              (1 point)  [ ] Unexplained or recurrent              (1 Point)    OTHER RISK FACTORS                                           (1 Point)  [ ] BMI >40, smoking, diabetes requiring insulin, chemotherapy  blood transfusions and length of surgery over 2 hours    SURGERY RELATED RISK FACTORS  [ ]  Section within the last month     (1 Point)  [ ] Minor surgery                                                  (1 Point)  [ ] Arthroscopic surgery                                       (2 Points)  [ ] Planned major surgery lasting more            (2 Points)      than 45 minutes     [ ] Elective hip or knee joint replacement       (5 points)       surgery                                                TRAUMA RELATED RISK FACTORS  [ ] Fracture of the hip, pelvis, or leg                       (5 Points)  [ ] Spinal cord injury resulting in paralysis             (5 points)       (in the previous month)    [ ] Paralysis  (less than 1 month)                             (5 Points)  [ ] Multiple Trauma within 1 month                        (5 Points)    Total Score [        ]    Caprini Score 0-2: Low Risk, NO VTE prophylaxis required for most patients, encourage ambulation  Caprini Score 3-6: Moderate Risk , pharmacologic VTE prophylaxis is indicated for most patients (in the absence of contraindications)  Caprini Score Greater than or =7: High risk, pharmocologic VTE prophylaxis indicated for most patients (in the absence of contraindications)              OPIOID RISK TOOL    ROSA EACH BOX THAT APPLIES AND ADD TOTALS AT THE END    FAMILY HISTORY OF SUBSTANCE ABUSE                 FEMALE         MALE                                                Alcohol                             [  ]1 pt          [  ]3pts                                               Illegal Durgs                     [  ]2 pts        [  ]3pts                                               Rx Drugs                           [  ]4 pts        [  ]4 pts    PERSONAL HISTORY OF SUBSTANCE ABUSE                                                                                          Alcohol                             [  ]3 pts       [  ]3 pts                                               Illegal Durgs                     [  ]4 pts        [  ]4 pts                                               Rx Drugs                           [  ]5 pts        [  ]5 pts    AGE BETWEEN 16-45 YEARS                                      [  ]1 pt         [  ]1 pt    HISTORY OF PREADOLESCENT   SEXUAL ABUSE                                                             [  ]3 pts        [  ]0pts    PSYCHOLOGICAL DISEASE                     ADD, OCD, Bipolar, Schizophrenia        [  ]2 pts         [  ]2 pts                      Depression                                               [  ]1 pt           [  ]1 pt           SCORING TOTAL   (add numbers and type here)              (***)                                     A score of 3 or lower indicated LOW risk for future opiod abuse  A score of 4 to 7 indicated moderate risk for future opiod abuse  A score of 8 or higher indicates a high risk for opiod abuse                 CAPRINI SCORE    AGE RELATED RISK FACTORS                                                             [ x] Age 41-60 years                                            (1 Point)  [ ] Age: 61-74 years                                           (2 Points)                 [ ] Age= 75 years                                                (3 Points)             DISEASE RELATED RISK FACTORS                                                       [ ] Edema in the lower extremities                 (1 Point)                     [ ] Varicose veins                                               (1 Point)                                 [x ] BMI > 25 Kg/m2                                            (1 Point)                                  [ ] Serious infection (ie PNA)                            (1 Point)                     [ ] Lung disease ( COPD, Emphysema)            (1 Point)                                                                          [ ] Acute myocardial infarction                         (1 Point)                  [ ] Congestive heart failure (in the previous month)  (1 Point)         [ ] Inflammatory bowel disease                            (1 Point)                  [ ] Central venous access, PICC or Port               (2 points)       (within the last month)                                                                [ ] Stroke (in the previous month)                        (5 Points)    [ ] Previous or present malignancy                       (2 points)                                                                                                                                                         HEMATOLOGY RELATED FACTORS                                                         [ ] Prior episodes of VTE                                     (3 Points)                     [ ] Positive family history for VTE                      (3 Points)                  [ ] Prothrombin 42768 A                                     (3 Points)                     [ ] Factor V Leiden                                                (3 Points)                        [ ] Lupus anticoagulants                                      (3 Points)                                                           [ ] Anticardiolipin antibodies                              (3 Points)                                                       [ ] High homocysteine in the blood                   (3 Points)                                             [ ] Other congenital or acquired thrombophilia      (3 Points)                                                [ ] Heparin induced thrombocytopenia                  (3 Points)                                        MOBILITY RELATED FACTORS  [ ] Bed rest                                                         (1 Point)  [ ] Plaster cast                                                    (2 points)  [ ] Bed bound for more than 72 hours           (2 Points)    GENDER SPECIFIC FACTORS  [ ] Pregnancy or had a baby within the last month   (1 Point)  [ ] Post-partum < 6 weeks                                   (1 Point)  [ ] Hormonal therapy  or oral contraception   (1 Point)  [ ] History of pregnancy complications              (1 point)  [ ] Unexplained or recurrent              (1 Point)    OTHER RISK FACTORS                                           (1 Point)  [ ] BMI >40, smoking, diabetes requiring insulin, chemotherapy  blood transfusions and length of surgery over 2 hours    SURGERY RELATED RISK FACTORS  [ ]  Section within the last month     (1 Point)  [ ] Minor surgery                                                  (1 Point)  [x ] Arthroscopic surgery                                       (2 Points)  [ ] Planned major surgery lasting more            (2 Points)      than 45 minutes     [ ] Elective hip or knee joint replacement       (5 points)       surgery                                                TRAUMA RELATED RISK FACTORS  [ ] Fracture of the hip, pelvis, or leg                       (5 Points)  [ ] Spinal cord injury resulting in paralysis             (5 points)       (in the previous month)    [ ] Paralysis  (less than 1 month)                             (5 Points)  [ ] Multiple Trauma within 1 month                        (5 Points)    Total Score [     4   ]    Caprini Score 0-2: Low Risk, NO VTE prophylaxis required for most patients, encourage ambulation  Caprini Score 3-6: Moderate Risk , pharmacologic VTE prophylaxis is indicated for most patients (in the absence of contraindications)  Caprini Score Greater than or =7: High risk, pharmocologic VTE prophylaxis indicated for most patients (in the absence of contraindications)    OPIOID RISK TOOL    ROSA EACH BOX THAT APPLIES AND ADD TOTALS AT THE END    FAMILY HISTORY OF SUBSTANCE ABUSE                 FEMALE         MALE                                                Alcohol                             [  ]1 pt          [  ]3pts                                               Illegal Durgs                     [  ]2 pts        [  ]3pts                                               Rx Drugs                           [  ]4 pts        [  ]4 pts    PERSONAL HISTORY OF SUBSTANCE ABUSE                                                                                          Alcohol                             [  ]3 pts       [  ]3 pts                                               Illegal Durgs                     [  ]4 pts        [  ]4 pts                                               Rx Drugs                           [  ]5 pts        [  ]5 pts    AGE BETWEEN 16-45 YEARS                                      [  ]1 pt         [  ]1 pt    HISTORY OF PREADOLESCENT   SEXUAL ABUSE                                                             [  ]3 pts        [  ]0pts    PSYCHOLOGICAL DISEASE                     ADD, OCD, Bipolar, Schizophrenia        [  ]2 pts         [  ]2 pts                      Depression                                               [  ]1 pt           [  ]1 pt           SCORING TOTAL   0                                    A score of 3 or lower indicated LOW risk for future opiod abuse  A score of 4 to 7 indicated moderate risk for future opiod abuse  A score of 8 or higher indicates a high risk for opiod abuse      43 y/o male retired  in , with a pmhx of HLD (well controlled with medication as per patient), cervical spine surgery, lumbar spine surgery, mild OA.  Patient presents to PST with complaint of  left knee pain for years but has gotten progressively worse in the last few months, pain is intermittent and sharp, located in the medial knee, worse with twisting and stairs. He notes some instability toward the end of the day,  5/10 in severity.  Patient had a left knee MRI on 22 showing a small radial tear of medial meniscus and cartilage loss at the wt bearing surface of the medical femoral condyle. He is S/p 9 sessions of PT without improvement in symptoms, has tried cortisone injections with temporary relief, taking meloxicam with some relief. Patient is now scheduled for left knee arthroscopy with medial meniscectomy on 22 with Dr Fox. Patient educated on surgical scrub, COVID testing, preadmission instructions, and day of procedure medications, verbalizes understanding. Pt instructed to stop vitamins/supplements/herbal medications/ASA/NSAIDS for one week prior to surgery and discuss with PMD.

## 2022-10-26 ENCOUNTER — TRANSCRIPTION ENCOUNTER (OUTPATIENT)
Age: 42
End: 2022-10-26

## 2022-11-07 ENCOUNTER — TRANSCRIPTION ENCOUNTER (OUTPATIENT)
Age: 42
End: 2022-11-07

## 2022-11-07 NOTE — DISCUSSION/SUMMARY
[Medication Risks Reviewed] : Medication risks reviewed [Surgical risks reviewed] : Surgical risks reviewed [de-identified] : 43 y/o M presents with  mod-severe medial compartmental osteoarthritis. Based on previous xrays he is not a candidate for a left TKA. His recent left knee MRI shows a radial tear of the medial meniscus of the left knee. Surgical options and conservative therapies were discussed in detail. We once again discussed that he is a candidate for a left knee arthroscopy.  We discussed exhausting conservative therapies such as cortisone injections, antiinflammatories. We encourage him to continue with conservative therapies and if he doesn't respond, it would be reasonable for him to pursue a knee scope. PT has been unhelpful. The pt deferred on injections today and is considering surgery. We gave him the surgical coordinators information if he hopes to pursue an arthroscopy. \par \par The percentages of success in an arthroscopy that involves a torn meniscus and arthritic changes is dependent upon how bad the arthritic changes are. Basically, removing a meniscal tear allows us to ascertain how bad the patient's articular cartilage destruction (arthritis) is. The arthroscopy cleans out any debris from the arthritic process as well as removing the meniscal tear. Approximately 75% of the patients will say that they feel relief, although their x-rays will continue to show significant arthritic changes. Arthroscopy for arthritis is a temporizing procedure, yielding subjective success (patient satisfaction) for less than two to five years. In some cases, the knee might eventually require a knee replacement for symptomatic relief. The prognostic factors that are somewhat favorable predictive values in arthroscopic debridements (removal of loose articular cartilage, loose body and inflamed synovium) of an arthritic knee are: short duration of symptoms, effusion (swelling), minimal deformity and good range of motion. The complications with any arthroscopy include the risk of anaesthetic complications and death, blood clots and pulmonary embolus, infection (less than 1%), nerve damage, by which we would mean a peroneal palsy (less than 0.1%) (small area of skin numbness is so common, we do not consider its presence a complication), injury to the popliteal artery, which is so rare that there are no statistics, but should it occur could theoretically lead to amputation, which is extremely unlikely. There is often a chance of getting a hemarthrosis (blood in the joint) but this usually resolves with local measures of icing, physical therapy, and aspiration. Reflex sympathetic dystrophy (RSD) is another extremely rare but theoretical complication. This (RSD) means that the patient has a stiff painful joint that is out of proportion to the objective pathology of the knee. Subsequently, it might require years of physical therapy before one regains a functional knee with RSD. Infrapatellar contracture syndrome (stiff joint) is sometimes reported and associated with RSD, but it usually is a result of not being aggressive in physical therapy. I think the patient understands the risk benefit ratio of arthroscopy and will think about whether they would prefer the nonoperative or surgical treatment option.

## 2022-11-07 NOTE — END OF VISIT
[FreeTextEntry3] : I, Zoey Ruvalcaba, acted solely as a scribe for Dr. Jean Marie Fox on 09/26/2022

## 2022-11-07 NOTE — HISTORY OF PRESENT ILLNESS
[Pain Location] : pain [2] : a current pain level of 2/10 [7] : a maximum pain level of 7/10 [NSAIDs] : relieved by nonsteroidal anti-inflammatory drugs [Rest] : relieved by rest [Worsening] : worsening [de-identified] : 41 y/o M presents with left knee pain. The pain started about 1 M ago without injury. He notes sharp medial knee pain with twisting. He also has pain with stairs. He notes some instability. He had a left knee MRI on 6/8/22 showing a small radial tear of medial meniscus and cartilage loss at the wt bearing surface of the medical femoral condyle. Last appointment, we sent a PT script. He did PT for 9 sessions but it did not improve his pain. Last appointment he had a left knee cortisone injection which has helped his pain for a short period. He still feels some medial pain. He feels as though at times his knee would tear more. He does some low impact exercise. \par He sometimes takes Mobic which helps. Pt was seen for right knee pain in the past and he reports mild intermittent pan. He is a retired  from a back fusion.\par \par \par MRI of the left knee performed at [facility] on 6/8/2022 showed the following:\par 1. Medial meniscus tear. The lateral meniscus and the cruciate ligaments are intact.\par 2. Cartilage loss along the weightbearing surface of the medial femoral condyle.\par 3. Moderate, thin fluid within the semimembranosus bursa. [de-identified] : stairs, twisting

## 2022-11-07 NOTE — PHYSICAL EXAM
[LE] : Sensory: Intact in bilateral lower extremities [ALL] : dorsalis pedis, posterior tibial, femoral, popliteal, and radial 2+ and symmetric bilaterally [Normal] : Alert and in no acute distress [Antalgic] : not antalgic [Poor Appearance] : well-appearing [de-identified] : GENERAL APPEARANCE: Well nourished and hydrated, pleasant, alert, and oriented x 3. Appears their stated age. \par HEENT: Normocephalic, extraocular eye motion intact. Nasal septum midline. Oral cavity clear. External auditory canal clear. \par RESPIRATORY: Breath sounds clear and audible in all lobes. No wheezing, No accessory muscle use.\par CARDIOVASCULAR: No apparent abnormalities. No lower leg edema. No varicosities. Pedal pulses are palpable.\par NEUROLOGIC: Sensation is normal, no muscle weakness in the upper or lower extremities.\par DERMATOLOGIC: No apparent skin lesions, moist, warm, no rash.\par SPINE: Cervical spine appears normal and moves freely; thoracic spine appears normal and moves freely; lumbosacral spine appears normal and moves freely, normal, nontender.\par MUSCULOSKELETAL: Hands, wrists, and elbows are normal and move freely, shoulders are normal and move freely.  [de-identified] : Left knee exam shows FROM, no effusion, medial joint line tenderness. + rell

## 2022-11-08 ENCOUNTER — APPOINTMENT (OUTPATIENT)
Dept: ORTHOPEDIC SURGERY | Facility: HOSPITAL | Age: 42
End: 2022-11-08

## 2022-11-08 ENCOUNTER — OUTPATIENT (OUTPATIENT)
Dept: OUTPATIENT SERVICES | Facility: HOSPITAL | Age: 42
LOS: 1 days | End: 2022-11-08
Payer: MEDICARE

## 2022-11-08 ENCOUNTER — TRANSCRIPTION ENCOUNTER (OUTPATIENT)
Age: 42
End: 2022-11-08

## 2022-11-08 VITALS
OXYGEN SATURATION: 99 % | TEMPERATURE: 97 F | HEART RATE: 66 BPM | HEIGHT: 70 IN | RESPIRATION RATE: 16 BRPM | WEIGHT: 199.96 LBS | SYSTOLIC BLOOD PRESSURE: 127 MMHG | DIASTOLIC BLOOD PRESSURE: 78 MMHG

## 2022-11-08 VITALS
TEMPERATURE: 98 F | OXYGEN SATURATION: 98 % | SYSTOLIC BLOOD PRESSURE: 114 MMHG | HEART RATE: 68 BPM | DIASTOLIC BLOOD PRESSURE: 70 MMHG | RESPIRATION RATE: 17 BRPM

## 2022-11-08 DIAGNOSIS — Z98.890 OTHER SPECIFIED POSTPROCEDURAL STATES: Chronic | ICD-10-CM

## 2022-11-08 DIAGNOSIS — S83.241A OTHER TEAR OF MEDIAL MENISCUS, CURRENT INJURY, RIGHT KNEE, INITIAL ENCOUNTER: ICD-10-CM

## 2022-11-08 LAB — BLD GP AB SCN SERPL QL: SIGNIFICANT CHANGE UP

## 2022-11-08 PROCEDURE — 86850 RBC ANTIBODY SCREEN: CPT

## 2022-11-08 PROCEDURE — 29881 ARTHRS KNE SRG MNISECTMY M/L: CPT | Mod: LT

## 2022-11-08 PROCEDURE — 36415 COLL VENOUS BLD VENIPUNCTURE: CPT

## 2022-11-08 PROCEDURE — 86900 BLOOD TYPING SEROLOGIC ABO: CPT

## 2022-11-08 PROCEDURE — 29881 ARTHRS KNE SRG MNISECTMY M/L: CPT | Mod: AS,LT

## 2022-11-08 PROCEDURE — 86901 BLOOD TYPING SEROLOGIC RH(D): CPT

## 2022-11-08 RX ORDER — MONTELUKAST 4 MG/1
1 TABLET, CHEWABLE ORAL
Qty: 0 | Refills: 0 | DISCHARGE

## 2022-11-08 RX ORDER — ONDANSETRON 8 MG/1
4 TABLET, FILM COATED ORAL ONCE
Refills: 0 | Status: DISCONTINUED | OUTPATIENT
Start: 2022-11-08 | End: 2022-11-08

## 2022-11-08 RX ORDER — METHOCARBAMOL 500 MG/1
2 TABLET, FILM COATED ORAL
Qty: 0 | Refills: 0 | DISCHARGE

## 2022-11-08 RX ORDER — GABAPENTIN 400 MG/1
1 CAPSULE ORAL
Qty: 0 | Refills: 0 | DISCHARGE

## 2022-11-08 RX ORDER — HYDROMORPHONE HYDROCHLORIDE 2 MG/ML
0.5 INJECTION INTRAMUSCULAR; INTRAVENOUS; SUBCUTANEOUS
Refills: 0 | Status: DISCONTINUED | OUTPATIENT
Start: 2022-11-08 | End: 2022-11-08

## 2022-11-08 RX ORDER — FAMOTIDINE 10 MG/ML
1 INJECTION INTRAVENOUS
Qty: 0 | Refills: 0 | DISCHARGE

## 2022-11-08 RX ORDER — ATORVASTATIN CALCIUM 80 MG/1
1 TABLET, FILM COATED ORAL
Qty: 0 | Refills: 0 | DISCHARGE

## 2022-11-08 RX ORDER — MELOXICAM 15 MG/1
1 TABLET ORAL
Qty: 0 | Refills: 0 | DISCHARGE

## 2022-11-08 RX ORDER — OXYCODONE AND ACETAMINOPHEN 5; 325 MG/1; MG/1
2 TABLET ORAL EVERY 6 HOURS
Refills: 0 | Status: DISCONTINUED | OUTPATIENT
Start: 2022-11-08 | End: 2022-11-08

## 2022-11-08 RX ORDER — ONDANSETRON 8 MG/1
1 TABLET, FILM COATED ORAL
Qty: 0 | Refills: 0 | DISCHARGE

## 2022-11-08 RX ORDER — OXYCODONE AND ACETAMINOPHEN 5; 325 MG/1; MG/1
1 TABLET ORAL EVERY 6 HOURS
Refills: 0 | Status: DISCONTINUED | OUTPATIENT
Start: 2022-11-08 | End: 2022-11-08

## 2022-11-08 RX ADMIN — OXYCODONE AND ACETAMINOPHEN 1 TABLET(S): 5; 325 TABLET ORAL at 08:50

## 2022-11-08 NOTE — ASU DISCHARGE PLAN (ADULT/PEDIATRIC) - NS MD DC FALL RISK RISK
For information on Fall & Injury Prevention, visit: [email or web address] OR  [email or web address] OR  https://www.cdc.gov/steadi/patient.html

## 2022-11-08 NOTE — ASU DISCHARGE PLAN (ADULT/PEDIATRIC) - CARE PROVIDER_API CALL
Jean Marie Fox)  Orthopaedic Surgery  [address]  [place] [address]  Phone: [phone]  Fax: [phone]  Follow Up Time:

## 2022-11-08 NOTE — PHYSICAL THERAPY INITIAL EVALUATION ADULT - RANGE OF MOTION EXAMINATION, REHAB EVAL
except right knee n/a/bilateral upper extremity ROM was WNL (within normal limits)/bilateral lower extremity was ROM was WNL (within normal limits)/deficits as listed below

## 2022-11-08 NOTE — BRIEF OPERATIVE NOTE - NSICDXBRIEFPOSTOP_GEN_ALL_CORE_FT
POST-OP DIAGNOSIS:  Complex tear of medial meniscus of left knee 08-Nov-2022 08:06:33  Jean Marie Fox

## 2022-11-21 ENCOUNTER — APPOINTMENT (OUTPATIENT)
Dept: ORTHOPEDIC SURGERY | Facility: CLINIC | Age: 42
End: 2022-11-21

## 2022-11-21 PROCEDURE — 99024 POSTOP FOLLOW-UP VISIT: CPT

## 2022-11-21 NOTE — HISTORY OF PRESENT ILLNESS
[Clean/Dry/Intact] : clean, dry and intact [Healed] : healed [Swelling] : swollen [Neuro Intact] : an unremarkable neurological exam [Vascular Intact] : ~T peripheral vascular exam normal [Negative Lito's] : maneuvers demonstrated a negative Lito's sign [1] : the patient reports pain that is 1/10 in severity [Doing Well] : is doing well [No Sign of Infection] : is showing no signs of infection [Adequate Pain Control] : has adequate pain control [Chills] : no chills [Constipation] : no constipation [Diarrhea] : no diarrhea [Dysuria] : no dysuria [Fever] : no fever [Nausea] : no nausea [Vomiting] : no vomiting [Erythema] : not erythematous [Discharge] : absent of discharge [Dehiscence] : not dehisced [de-identified] : s/p left knee arthroscopy and partial medial meniscectomy DOS: 11/8/22 [de-identified] : 43 y/o M pt is 3 weeks post op from left knee scope. He says he is overall doing well. He says he feels pressure on his knees when squatting. He still feels a twist and sharp pain that radiates down on the medial side. He says it is intermittent. He says that his pain has overall improved.  [de-identified] : Left knee exam shows arthroscopy incision portals healing. fROM  [de-identified] : We reassured him that his pain should improve with time. He should continue to do low impact exercises. F/u with us in 3 weeks.

## 2022-11-21 NOTE — END OF VISIT
[FreeTextEntry3] : I, Zoey Ruvalcaba, acted solely as a scribe for Dr. Jean Marie Fox on 11/21/2022

## 2023-01-13 ENCOUNTER — APPOINTMENT (OUTPATIENT)
Dept: ORTHOPEDIC SURGERY | Facility: CLINIC | Age: 43
End: 2023-01-13
Payer: MEDICARE

## 2023-01-13 VITALS — BODY MASS INDEX: 29.35 KG/M2 | WEIGHT: 205 LBS | HEIGHT: 70 IN

## 2023-01-13 DIAGNOSIS — Z98.890 OTHER SPECIFIED POSTPROCEDURAL STATES: ICD-10-CM

## 2023-01-13 PROCEDURE — 99024 POSTOP FOLLOW-UP VISIT: CPT

## 2023-01-13 NOTE — HISTORY OF PRESENT ILLNESS
[Clean/Dry/Intact] : clean, dry and intact [Healed] : healed [Swelling] : swollen [Neuro Intact] : an unremarkable neurological exam [Vascular Intact] : ~T peripheral vascular exam normal [Negative Lito's] : maneuvers demonstrated a negative Lito's sign [0] : no pain reported [Doing Well] : is doing well [Excellent Pain Control] : has excellent pain control [No Sign of Infection] : is showing no signs of infection [Chills] : no chills [Constipation] : no constipation [Diarrhea] : no diarrhea [Dysuria] : no dysuria [Fever] : no fever [Nausea] : no nausea [Vomiting] : no vomiting [Erythema] : not erythematous [Discharge] : absent of discharge [Dehiscence] : not dehisced [de-identified] : s/p Left knee arthroscopy and partial medial meniscectomy DOS: 11/08/22 [de-identified] : 41 yo M pt is 9 weeks post op from left knee scope. he notes intermittent pain in the medial knee with certain motion but overall he is doing well and satisfied with surgical outcome\par he is not in PT., no walking with a cane\par no limits with ADLs \par pt is not working /retired after 5 spine operation.  [de-identified] : Left knee exam shows healed scope portals. ROM is 0-120 degree\par  no effusion\par  no pain with ambulation  [de-identified] : no xray [de-identified] : He should continue to do low impact exercises. He is doing well. F/u with us in 3 weeks.

## 2023-01-13 NOTE — END OF VISIT
[FreeTextEntry3] : I, Zoey Ruvalcaba, acted solely as a scribe for Dr. Jean Marie Fox on 01/13/2023

## 2023-02-06 PROBLEM — E78.5 HYPERLIPIDEMIA, UNSPECIFIED: Chronic | Status: ACTIVE | Noted: 2022-10-24

## 2023-02-06 PROBLEM — M25.562 PAIN IN LEFT KNEE: Chronic | Status: ACTIVE | Noted: 2022-10-24

## 2023-02-06 PROBLEM — M54.12 RADICULOPATHY, CERVICAL REGION: Chronic | Status: ACTIVE | Noted: 2022-10-24

## 2023-02-06 PROBLEM — M19.90 UNSPECIFIED OSTEOARTHRITIS, UNSPECIFIED SITE: Chronic | Status: ACTIVE | Noted: 2022-10-24

## 2023-02-08 ENCOUNTER — APPOINTMENT (OUTPATIENT)
Dept: ORTHOPEDIC SURGERY | Facility: CLINIC | Age: 43
End: 2023-02-08
Payer: MEDICARE

## 2023-02-08 PROCEDURE — 99214 OFFICE O/P EST MOD 30 MIN: CPT

## 2023-02-08 NOTE — DISCUSSION/SUMMARY
[de-identified] : Today I had a lengthy discussion with the patient regarding their left ankle injury. I have addressed all the patient's concerns surrounding the pathology of their condition. The MRI was reviewed with the patient in great detail. I recommended that the patient continues to use the CAM boot for ambulation. In two weeks, I recommend the patient undergo a course of physical therapy for the left ankle 2-3 times a week for a total of 6-8 weeks. A prescription was given for the physical therapy today. With the guidance of a physical therapy, at week 4, I recommended that the patient transitions out of the CAM boot into an ASO brace. The patient was fitted for the ASO brace in the office today. To help mitigate pain and swelling, I recommend that the patient utilize ice, NSAIDS PRN, and heat. The patient understood and verbally agreed to the treatment plan. All of their questions were answered and they were satisfied with the visit. The patient should call the office if they have any questions or experience worsening symptoms.\par \par I would like to see the patient back in the office in 4 weeks to reassess their condition.\par

## 2023-02-08 NOTE — PHYSICAL EXAM
[de-identified] : General: Alert and oriented x3. In no acute distress. Pleasant in nature with a normal affect. No apparent respiratory distress.\par \par Left Ankle Exam\par Skin: Clean, dry, intact\par Inspection: No obvious malalignment, no swelling, no effusion; no lymphadenopathy\par Pulses: 2+ DP/PT pulses\par ROM: Left Ankle 10 degrees of dorsiflexion, 40 degrees of plantarflexion, Eversion: 35, Inversion: 35; 10 degrees of subtalar motion.\par Tenderness: + tenderness over the lateral malleolus, +tenderness retro fibular pain, + CFL/+ ATFL/+ PTFL pain. No medial malleolus pain, no deltoid ligament pain. No proximal fibular pain. No heel pain.\par Stability: Negative anterior/posterior drawer.\par Strength: 5/5 TA/GS/EHL\par Neuro: In tact to light touch throughout\par Additional tests: Negative Mortons test, Negative syndesmosis squeeze test. Negative Lito's Sign.\par  [de-identified] : MRI-LEFT ANKLE NON CONTRAST\par \par HISTORY: Left ankle pain status post injury\par \par TECHNIQUE: MR imaging of the left ankle was performed without IV contrast on a\par 3.0 Gwen Ultra High Field Wide Bore MRI unit utilizing the following pulse\par sequences: Axial proton density with and without fat suppression, sagittal\par proton density and STIR, coronal proton density.\par \par COMPARISON: No prior studies are available for comparison\par \par FINDINGS:\par \par Bones/Joints: There is bone marrow edema in the medial aspect of the talus\par compatible with microtrabecular fracture.The talar dome is maintained without\par evidence for osteochondral injury. The subtalar, calcaneocuboid and\par talonavicular joints are intact. The articular cartilage is maintained. There\par is a moderate posterior subtalar joint effusion.\par Ligaments: There is full-thickness tearing of anterior talofibular ligament and\par partial tearing the calcaneal fibular ligament. The anterior and posterior\par syndesmotic ligaments and the posterior talofibular ligament are intact. There\par is partial tearing of the deep fibers of the deltoid ligament.\par \par The sinus Tarsi demonstrates normal signal intensity in the cervical and\par interosseous ligaments are intact.\par \par \par Tendons/Muscles: The tibialis posterior, flexor hallucis longus and flexor\par digitorum tendons are intact.The peroneus longus and brevis tendons are intact.\par The tibialis anterior, extensor digitorum and extensor hallucis tendons are\par intact. There is no disproportionate muscle atrophy or intramuscular edema.\par \par Achilles tendon and Plantar Fascia: The plantar fascia is intact. The Achilles\par tendon is intact.\par \par Miscellaneous: The subcutaneous tissues are within normal limits. There is no\par space occupying mass within the tarsal tunnel.\par \par IMPRESSION:\par \par 1. Full-thickness tear of the anterior talofibular ligament and partial tear of\par the calcaneofibular ligament.\par 2. Microtrabecular fracture in the medial aspect of the talus.\par 3. Moderate posterior subtalar joint effusion.\par \par Signed by: Huber Fernandez MD\par Signed Date: 2/6/2023 12:14 PM EST\par \par \par \par SIGNED BY: Huber Fernandez M.D., Ext. [phone] 02/06/2023 12:14 PM

## 2023-02-08 NOTE — HISTORY OF PRESENT ILLNESS
[FreeTextEntry1] : The patient is a 43 yo male presenting for an initial visit of his left ankle injury. In January, the patient states that he was was walking down the stairs, skipped a step and rolled his ankle. The patient was initially treating his injury conservatively with ice, elevation and ibuprofen. Since his symptoms weren't improving, he went to Urgent Care where he obtained a prescription for a MRI. He endorses lateral ankle pain and swelling. He is a retired [employer] . The patient presents wearing a CAM boot and is ambulating without assistance. No other complaints.\par

## 2023-03-08 ENCOUNTER — APPOINTMENT (OUTPATIENT)
Dept: ORTHOPEDIC SURGERY | Facility: CLINIC | Age: 43
End: 2023-03-08
Payer: MEDICARE

## 2023-03-08 PROCEDURE — 99213 OFFICE O/P EST LOW 20 MIN: CPT

## 2023-03-08 NOTE — HISTORY OF PRESENT ILLNESS
[FreeTextEntry1] : 3/8/2023: The patient is a 41 yo male presenting for an follow-up visit of his left ankle injury.  The patient continues with outpatient physical therapy but also continues to have intermittent pains of the left ankle.  He continues to wear his long cam walking boot and has not transitioned out of the boot just yet.  He is concerned because during the summertime he likes to go to the beach and wants to make sure his ankle is going to be okay to walk on sand.  He denies locking and catching of his ankle.  No other complaints.\par \par 2/8/2023: The patient is a 41 yo male presenting for an initial visit of his left ankle injury. In January, the patient states that he was was walking down the stairs, skipped a step and rolled his ankle. The patient was initially treating his injury conservatively with ice, elevation and ibuprofen. Since his symptoms weren't improving, he went to Urgent Care where he obtained a prescription for a MRI. He endorses lateral ankle pain and swelling. He is a retired [employer] . The patient presents wearing a CAM boot and is ambulating without assistance. No other complaints.\par

## 2023-03-08 NOTE — ADDENDUM
[FreeTextEntry1] : I, Jovanni Mason, acted solely as a scribe for Dr. Pepe Franklin on this date 03/08/2023  .\par  \par All medical record entries made by the Scribe were at my, Dr. Pepe Franklin, direction and personally dictated by me on 03/08/2023 . I have reviewed the chart and agree that the record accurately reflects my personal performance of the history, physical exam, assessment and plan. I have also personally directed, reviewed, and agreed with the chart.

## 2023-03-08 NOTE — PHYSICAL EXAM
[de-identified] : General: Alert and oriented x3. In no acute distress. Pleasant in nature with a normal affect. No apparent respiratory distress.\par \par Left Ankle Exam\par Skin: Clean, dry, intact\par Inspection: No obvious malalignment, no swelling, no effusion; no lymphadenopathy\par Pulses: 2+ DP/PT pulses\par ROM: Left Ankle 10 degrees of dorsiflexion, 40 degrees of plantarflexion, Eversion: 35, Inversion: 35; 10 degrees of subtalar motion.\par Tenderness: + tenderness over the lateral malleolus improved, + improved tenderness retro fibular pain, + CFL/+ ATFL/+ PTFL pain, improved pain throughout the ligaments. No medial malleolus pain, no deltoid ligament pain. No proximal fibular pain. No heel pain.\par Stability: Negative anterior/posterior drawer.\par Strength: 5/5 TA/GS/EHL\par Neuro: In tact to light touch throughout\par Additional tests: Negative Mortons test, Negative syndesmosis squeeze test. Negative Lito's Sign.\par  [de-identified] : No new imaging performed.

## 2023-03-08 NOTE — DISCUSSION/SUMMARY
[de-identified] : At this time I explained to the patient that a bad ankle sprain can take 3 to 4 months to heal.  I want him to continue with outpatient physical therapy and an updated PT prescription was given to get out of the cam boot and go into an ASO brace continue to follow the ankle sprain protocol.  He will continue to work on ankle range of motion and strength.  We reviewed the MRI last office visit and answered all his questions about the MRI findings.  Patient did ask about his ankle working normally in the near future and I did explain to him that ankle sprains usually heal on their own completely fine.  There is a chance that he might roll his ankle once again and he does understand this due to ligaments being injured.  All of his questions were answered.  He will continue with conservative management observation.  Follow-up in 8 weeks.

## 2023-05-04 ENCOUNTER — APPOINTMENT (OUTPATIENT)
Dept: ORTHOPEDIC SURGERY | Facility: CLINIC | Age: 43
End: 2023-05-04
Payer: MEDICARE

## 2023-05-04 DIAGNOSIS — S93.492A SPRAIN OF OTHER LIGAMENT OF LEFT ANKLE, INITIAL ENCOUNTER: ICD-10-CM

## 2023-05-04 PROCEDURE — 99214 OFFICE O/P EST MOD 30 MIN: CPT | Mod: 25

## 2023-05-04 PROCEDURE — 20605 DRAIN/INJ JOINT/BURSA W/O US: CPT | Mod: LT

## 2023-05-04 NOTE — PROCEDURE
[FreeTextEntry1] : Cortisone Injection\par The risks and benefits of the injection were reviewed with the patient today in office and all their questions were answered. The injection site was the medial aspect of the left ankle with the patient lying down. Prior to giving the injection the injection site was prepped with chloraprep and a sterile field was created. Sterile technique was carried out throughout the procedure. After verbal consent from the patient we went ahead and injected the left ankle tibiotalar joint today with 1 ml 40 mg Depo-Medrol, 1 ml 1% lidocaine and 1 ml of .25% Bupivacaine for a total of 3 ml with a 25 patricia 1.5" needle. The medication was placed into the left ankle tibiotalar joint without complication. Post injection the patient reported no pain, had a normal gait and good motion of the left ankle. The patient denied numbness and tingling going down their ankle. There were no complications during the procedure.

## 2023-05-04 NOTE — PHYSICAL EXAM
[de-identified] : General: Alert and oriented x3. In no acute distress. Pleasant in nature with a normal affect. No apparent respiratory distress.\par \par Left Ankle Exam\par Skin: Clean, dry, intact\par Inspection: No obvious malalignment, no swelling, no effusion; no lymphadenopathy\par Pulses: 2+ DP/PT pulses\par ROM: Left Ankle 10 degrees of dorsiflexion, 40 degrees of plantarflexion, Eversion: 35, Inversion: 35; 10 degrees of subtalar motion.\par Tenderness: + tenderness over the lateral malleolus improved, + improved tenderness retro fibular pain, + CFL/+ ATFL/+ PTFL pain, improved pain throughout the ligaments. No medial malleolus pain, no deltoid ligament pain. No proximal fibular pain. No heel pain.\par Stability: Negative anterior/posterior drawer.\par Strength: 5/5 TA/GS/EHL\par Neuro: In tact to light touch throughout\par Additional tests: Negative Mortons test, Negative syndesmosis squeeze test. Negative Lito's Sign.\par  [de-identified] : No new imaging performed.

## 2023-05-04 NOTE — ADDENDUM
[FreeTextEntry1] : I, WINSTON ACEJODIE, acted solely as a scribe for Dr. Pepe Franklin on this date 05/04/2023  .\par  \par All medical record entries made by the Scribe were at my, Dr. Peep Franklin, direction and personally dictated by me on 05/04/2023 . I have reviewed the chart and agree that the record accurately reflects my personal performance of the history, physical exam, assessment and plan. I have also personally directed, reviewed, and agreed with the chart.

## 2023-05-04 NOTE — HISTORY OF PRESENT ILLNESS
[FreeTextEntry1] : 5/4/2023: The patient is a 42 year old male presenting for a follow-up evaluation of his left ankle. He reports that his symptoms have remained the same since his last visit. He is complaining of buckling and instability of his left ankle. He has pain along the medial aspect of his left ankle and going down the stairs, even with the brace, and after standing or walking for prolonged periods of time, he experiences sharp pain. Medicare stopped his physical therapy after 16 weeks. He is a retired . He presents in eakers and ambulating without assistance. \par \par 3/8/2023: The patient is a 41 yo male presenting for an follow-up visit of his left ankle injury.  The patient continues with outpatient physical therapy but also continues to have intermittent pains of the left ankle.  He continues to wear his long cam walking boot and has not transitioned out of the boot just yet.  He is concerned because during the summertime he likes to go to the beach and wants to make sure his ankle is going to be okay to walk on sand.  He denies locking and catching of his ankle.  No other complaints.\par \par 2/8/2023: The patient is a 41 yo male presenting for an initial visit of his left ankle injury. In January, the patient states that he was was walking down the stairs, skipped a step and rolled his ankle. The patient was initially treating his injury conservatively with ice, elevation and ibuprofen. Since his symptoms weren't improving, he went to Urgent Care where he obtained a prescription for a MRI. He endorses lateral ankle pain and swelling. He is a retired [employer] . The patient presents wearing a CAM boot and is ambulating without assistance. No other complaints.\par

## 2023-05-04 NOTE — DISCUSSION/SUMMARY
[de-identified] : Today I had a lengthy discussion with the patient regarding their left ankle pain.I have addressed all the patient's concerns surrounding the pathology of their condition. I recommend that the patient utilize ice, NSAIDS PRN, and heat. Surgical discussion was had. \par \par  A discussion was had about a possible cortisone injection for the left ankle. The patient would like to move forward with the procedure. The injection was administered in the office today. The patient tolerated the procedure well with no resistance. \par \par The patient understood and verbally agreed to the treatment plan. All of their questions were answered and they were satisfied with the visit. The patient should call the office if they have any questions or experience worsening symptoms. \par \par Follow up in 2-3 months.

## 2023-08-31 ENCOUNTER — APPOINTMENT (OUTPATIENT)
Dept: ORTHOPEDIC SURGERY | Facility: CLINIC | Age: 43
End: 2023-08-31
Payer: MEDICARE

## 2023-08-31 DIAGNOSIS — S99.912A UNSPECIFIED INJURY OF LEFT ANKLE, INITIAL ENCOUNTER: ICD-10-CM

## 2023-08-31 DIAGNOSIS — S92.125A NONDISPLACED FRACTURE OF BODY OF LEFT TALUS, INITIAL ENCOUNTER FOR CLOSED FRACTURE: ICD-10-CM

## 2023-08-31 PROCEDURE — 73610 X-RAY EXAM OF ANKLE: CPT | Mod: LT

## 2023-08-31 PROCEDURE — 99213 OFFICE O/P EST LOW 20 MIN: CPT

## 2023-08-31 NOTE — PHYSICAL EXAM
[de-identified] : General: Alert and oriented x3. In no acute distress. Pleasant in nature with a normal affect. No apparent respiratory distress.  Left Ankle Exam  Skin: Clean, dry, intact Inspection: No obvious malalignment, no swelling, no effusion; no lymphadenopathy Pulses: 2+ DP/PT pulses ROM: Left Ankle 10 degrees of dorsiflexion, 40 degrees of plantarflexion, Eversion: 35, Inversion: 35; 10 degrees of subtalar motion. Tenderness: + tenderness over the lateral malleolus improved, + improved tenderness retro fibular pain, + CFL/+ ATFL/+ PTFL pain, improved pain throughout the ligaments. No medial malleolus pain, no deltoid ligament pain. No proximal fibular pain. No heel pain. Stability: Negative anterior/posterior drawer. Strength: 5/5 TA/GS/EHL Neuro: In tact to light touch throughout Additional tests: Negative Mortons test, Negative syndesmosis squeeze test. Negative Lito's Sign.  [de-identified] : 3V of the left ankle were ordered, obtained and reviewed by me today, 08/31/2023, revealed: reasonable joint space. otherwise, unremarkable.

## 2023-08-31 NOTE — HISTORY OF PRESENT ILLNESS
[FreeTextEntry1] : 08/31/2023: The patient is a 42 year old male presenting to the office for a follow-up evaluation of his left ankle. He reports that he is doing well with overall improved symptoms, but he is still noting some discomfort when waking up in the morning or going down the stairs. He feels a pain anteriorly and catching in his ankle while going down the stairs. He reports that the cortisone injection he received in May helped to relieve his pain for a few days, but not the catching. He feels most limited while playing sports and playing with his son. He had been compliant with physical therapy, but he reports it did not relieve any of his symptoms. He presents to the office in sneakers and ambulating without assistance.   Sx Cervical and Lumbar fusions.   5/4/2023: The patient is a 42 year old male presenting for a follow-up evaluation of his left ankle. He reports that his symptoms have remained the same since his last visit. He is complaining of buckling and instability of his left ankle. He has pain along the medial aspect of his left ankle and going down the stairs, even with the brace, and after standing or walking for prolonged periods of time, he experiences sharp pain. Medicare stopped his physical therapy after 16 weeks. He is a retired . He presents in sneakers and ambulating without assistance.   3/8/2023: The patient is a 43 yo male presenting for an follow-up visit of his left ankle injury.  The patient continues with outpatient physical therapy but also continues to have intermittent pains of the left ankle.  He continues to wear his long cam walking boot and has not transitioned out of the boot just yet.  He is concerned because during the summertime he likes to go to the beach and wants to make sure his ankle is going to be okay to walk on sand.  He denies locking and catching of his ankle.  No other complaints.  2/8/2023: The patient is a 43 yo male presenting for an initial visit of his left ankle injury. In January, the patient states that he was was walking down the stairs, skipped a step and rolled his ankle. The patient was initially treating his injury conservatively with ice, elevation and ibuprofen. Since his symptoms weren't improving, he went to Urgent Care where he obtained a prescription for a MRI. He endorses lateral ankle pain and swelling. He is a retired [employer] . The patient presents wearing a CAM boot and is ambulating without assistance. No other complaints.

## 2023-08-31 NOTE — ADDENDUM
[FreeTextEntry1] : I, WINSTON EDILSON, acted solely as a scribe for Dr. Pepe Franklin on this date 08/31/2023  .   All medical record entries made by the Scribe were at my, Dr. Pepe Franklin, direction and personally dictated by me on 08/31/2023 . I have reviewed the chart and agree that the record accurately reflects my personal performance of the history, physical exam, assessment and plan. I have also personally directed, reviewed, and agreed with the chart.

## 2023-08-31 NOTE — DISCUSSION/SUMMARY
[de-identified] : Today I had a lengthy discussion with the patient regarding their left ankle pain. I have addressed all the patient's concerns surrounding the pathology of their condition. I recommend that the patient utilize ice, NSAIDS PRN, and heat. Surgical discussion was had. I recommend that the patient utilize Voltaren gel topically. If the Voltaren gel could not be obtained, Icy Hot, Biofreeze, or Bengay can be utilized instead.  The patient understood and verbally agreed to the treatment plan. All of their questions were answered and they were satisfied with the visit. The patient should call the office if they have any questions or experience worsening symptoms.   Follow up in 2-3 months.

## 2023-09-21 NOTE — H&P PST ADULT - PULMONARY EMBOLUS
Render In Strict Bullet Format?: No Detail Level: Zone Continue Regimen: Methotrexate and folic acid, triamcinolone no

## (undated) DEVICE — SUT NYLON 3-0 18" PS-2

## (undated) DEVICE — GLV 8 PROTEXIS (WHITE)

## (undated) DEVICE — DRSG COMBINE 5X9"

## (undated) DEVICE — NDL HYPO SAFE 18G X 1.5" (PINK)

## (undated) DEVICE — DRSG DERMABOND 0.7ML

## (undated) DEVICE — TOURNIQUET ESMARK 6"

## (undated) DEVICE — VENODYNE/SCD SLEEVE CALF MEDIUM

## (undated) DEVICE — DRAPE SPLIT SHEET 77" X 108"

## (undated) DEVICE — S&N ARTHROCARE WAND COBLATION WEREWOLF FLOW 90

## (undated) DEVICE — SHAVER BLADE GATOR 4.2MM X 19CM

## (undated) DEVICE — DRAPE XL SHEET 77X98"

## (undated) DEVICE — SUT MONOCRYL 3-0 27" PS-2 UNDYED

## (undated) DEVICE — SOL IRR POUR H2O 1000ML

## (undated) DEVICE — WARMING BLANKET UPPER ADULT

## (undated) DEVICE — SUT ETHILON 3-0 18" PS-1

## (undated) DEVICE — DRAPE 1/2 SHEET 40X57"

## (undated) DEVICE — DRAPE U LONG 47X70"

## (undated) DEVICE — SOL IRR POUR NS 0.9% 1000ML

## (undated) DEVICE — SYR LUER LOK 30CC

## (undated) DEVICE — TUBING LINVATEC ARTHROSCOPY IN/OUTFLOW

## (undated) DEVICE — PACK KNEE ARTHROSCOPY

## (undated) DEVICE — NDL SPINAL 18G X 3.5" (PINK)

## (undated) DEVICE — GLV 8.5 BIOGEL